# Patient Record
Sex: FEMALE | Race: ASIAN | NOT HISPANIC OR LATINO | Employment: FULL TIME | URBAN - METROPOLITAN AREA
[De-identification: names, ages, dates, MRNs, and addresses within clinical notes are randomized per-mention and may not be internally consistent; named-entity substitution may affect disease eponyms.]

---

## 2019-06-11 ENCOUNTER — OFFICE VISIT (OUTPATIENT)
Dept: URGENT CARE | Facility: CLINIC | Age: 24
End: 2019-06-11
Payer: COMMERCIAL

## 2019-06-11 VITALS
RESPIRATION RATE: 16 BRPM | TEMPERATURE: 98 F | BODY MASS INDEX: 25.52 KG/M2 | SYSTOLIC BLOOD PRESSURE: 134 MMHG | HEART RATE: 77 BPM | WEIGHT: 130 LBS | OXYGEN SATURATION: 100 % | HEIGHT: 60 IN | DIASTOLIC BLOOD PRESSURE: 70 MMHG

## 2019-06-11 DIAGNOSIS — N89.8 VAGINAL ITCHING: Primary | ICD-10-CM

## 2019-06-11 DIAGNOSIS — Z20.2 POSSIBLE EXPOSURE TO STD: ICD-10-CM

## 2019-06-11 LAB
B-HCG UR QL: NEGATIVE
BILIRUB UR QL STRIP: NEGATIVE
CTP QC/QA: YES
GLUCOSE UR QL STRIP: NEGATIVE
KETONES UR QL STRIP: NEGATIVE
LEUKOCYTE ESTERASE UR QL STRIP: NEGATIVE
PH, POC UA: 6 (ref 5–8)
POC BLOOD, URINE: NEGATIVE
POC NITRATES, URINE: NEGATIVE
PROT UR QL STRIP: NEGATIVE
SP GR UR STRIP: 1.01 (ref 1–1.03)
UROBILINOGEN UR STRIP-ACNC: NORMAL (ref 0.1–1.1)

## 2019-06-11 PROCEDURE — 87510 GARDNER VAG DNA DIR PROBE: CPT

## 2019-06-11 PROCEDURE — 87491 CHLMYD TRACH DNA AMP PROBE: CPT

## 2019-06-11 PROCEDURE — 99203 PR OFFICE/OUTPT VISIT, NEW, LEVL III, 30-44 MIN: ICD-10-PCS | Mod: S$GLB,,, | Performed by: NURSE PRACTITIONER

## 2019-06-11 PROCEDURE — 81003 URINALYSIS AUTO W/O SCOPE: CPT | Mod: QW,S$GLB,, | Performed by: NURSE PRACTITIONER

## 2019-06-11 PROCEDURE — 3008F PR BODY MASS INDEX (BMI) DOCUMENTED: ICD-10-PCS | Mod: CPTII,S$GLB,, | Performed by: NURSE PRACTITIONER

## 2019-06-11 PROCEDURE — 81003 POCT URINALYSIS, DIPSTICK, AUTOMATED, W/O SCOPE: ICD-10-PCS | Mod: QW,S$GLB,, | Performed by: NURSE PRACTITIONER

## 2019-06-11 PROCEDURE — 99203 OFFICE O/P NEW LOW 30 MIN: CPT | Mod: S$GLB,,, | Performed by: NURSE PRACTITIONER

## 2019-06-11 PROCEDURE — 81025 URINE PREGNANCY TEST: CPT | Mod: S$GLB,,, | Performed by: NURSE PRACTITIONER

## 2019-06-11 PROCEDURE — 81025 POCT URINE PREGNANCY: ICD-10-PCS | Mod: S$GLB,,, | Performed by: NURSE PRACTITIONER

## 2019-06-11 PROCEDURE — 3008F BODY MASS INDEX DOCD: CPT | Mod: CPTII,S$GLB,, | Performed by: NURSE PRACTITIONER

## 2019-06-11 PROCEDURE — 87480 CANDIDA DNA DIR PROBE: CPT

## 2019-06-11 RX ORDER — FLUCONAZOLE 150 MG/1
150 TABLET ORAL DAILY
Qty: 1 TABLET | Refills: 0 | Status: SHIPPED | OUTPATIENT
Start: 2019-06-11 | End: 2019-06-12

## 2019-06-11 NOTE — PROGRESS NOTES
Subjective:       Patient ID: Ramiro Moe is a 23 y.o. female.    Vitals:  height is 5' (1.524 m) and weight is 59 kg (130 lb). Her temperature is 98.4 °F (36.9 °C). Her blood pressure is 134/70 and her pulse is 77. Her respiration is 16 and oxygen saturation is 100%.     Chief Complaint: Vaginal Itching    Vaginal discomfort for 3 days     Vaginal Itching   The patient's primary symptoms include genital itching. The patient's pertinent negatives include no missed menses, pelvic pain or vaginal discharge. This is a new problem. The problem occurs intermittently. The problem has been unchanged. She is not pregnant. Pertinent negatives include no abdominal pain, back pain, chills, dysuria, fever, frequency, hematuria, nausea, rash, urgency or vomiting. Nothing aggravates the symptoms. She has tried nothing for the symptoms. She is sexually active. She uses condoms for contraception. There is no history of ovarian cysts.       Constitution: Negative for chills and fever.   Neck: Negative for painful lymph nodes.   Gastrointestinal: Negative for abdominal pain, nausea and vomiting.   Genitourinary: Negative for dysuria, frequency, urgency, urine decreased, hematuria, history of kidney stones, painful menstruation, irregular menstruation, missed menses, heavy menstrual bleeding, ovarian cysts, genital trauma, vaginal pain, vaginal discharge, vaginal bleeding, vaginal odor, painful intercourse, genital sore, painful ejaculation and pelvic pain.   Musculoskeletal: Negative for back pain.   Skin: Negative for rash and lesion.   Hematologic/Lymphatic: Negative for swollen lymph nodes.       Objective:      Physical Exam   Constitutional: She is oriented to person, place, and time. She appears well-developed and well-nourished. She is cooperative.  Non-toxic appearance. She does not appear ill. No distress.   HENT:   Head: Normocephalic and atraumatic.   Right Ear: Hearing, tympanic membrane, external ear and ear canal  normal.   Left Ear: Hearing, tympanic membrane, external ear and ear canal normal.   Nose: Nose normal. No mucosal edema, rhinorrhea or nasal deformity. No epistaxis. Right sinus exhibits no maxillary sinus tenderness and no frontal sinus tenderness. Left sinus exhibits no maxillary sinus tenderness and no frontal sinus tenderness.   Mouth/Throat: Uvula is midline, oropharynx is clear and moist and mucous membranes are normal. No trismus in the jaw. Normal dentition. No uvula swelling. No posterior oropharyngeal erythema.   Eyes: Conjunctivae and lids are normal. Right eye exhibits no discharge. Left eye exhibits no discharge. No scleral icterus.   Sclera clear bilat   Neck: Trachea normal, normal range of motion, full passive range of motion without pain and phonation normal. Neck supple.   Cardiovascular: Normal rate, regular rhythm, normal heart sounds, intact distal pulses and normal pulses.   Pulmonary/Chest: Effort normal and breath sounds normal. No respiratory distress.   Abdominal: Soft. Normal appearance and bowel sounds are normal. She exhibits no distension, no pulsatile midline mass and no mass. There is no tenderness. There is no rigidity, no rebound, no guarding, no CVA tenderness, no tenderness at McBurney's point and negative Farr's sign.   Musculoskeletal: Normal range of motion. She exhibits no edema or deformity.   Neurological: She is alert and oriented to person, place, and time. She exhibits normal muscle tone. Coordination normal.   Skin: Skin is warm, dry and intact. She is not diaphoretic. No pallor.   Psychiatric: She has a normal mood and affect. Her speech is normal and behavior is normal. Judgment and thought content normal. Cognition and memory are normal.   Nursing note and vitals reviewed.      Results for orders placed or performed in visit on 06/11/19   POCT Urinalysis, Dipstick, Automated, W/O Scope   Result Value Ref Range    POC Blood, Urine Negative Negative    POC Bilirubin,  Urine Negative Negative    POC Urobilinogen, Urine Normal 0.1 - 1.1    POC Ketones, Urine Negative Negative    POC Protein, Urine Negative Negative    POC Nitrates, Urine Negative Negative    POC Glucose, Urine Negative Negative    pH, UA 6.0 5 - 8    POC Specific Gravity, Urine 1.010 1.003 - 1.029    POC Leukocytes, Urine Negative Negative   POCT urine pregnancy   Result Value Ref Range    POC Preg Test, Ur Negative Negative     Acceptable Yes      Assessment:       1. Vaginal itching    2. Possible exposure to STD        Plan:       Patient concern for cost of STD testing.  Testing for gonorrhea, chlamydia and Trichomonas as today. Given patient [facility] Flyer for remainder of testing.   Vaginal itching  -     POCT Urinalysis, Dipstick, Automated, W/O Scope  -     Bv, Trich, Candida by DNA Probe (Swab Only)    Possible exposure to STD  -     C. trachomatis/N. gonorrhoeae by AMP DNA [facility]; Urine          Patient Instructions     Will call patient with results of testing as soon as we get them back.    Educated patient on safe sex practices.    Educated patient to follow up with OBGYN.      If You Think You Have an STD  Treating a sexually transmitted disease (STD) early limits the problems they can cause. If you have an STD, get treated right away. Ask your partner to be tested, too. Then avoid sex until youve finished treatment and your healthcare provider says its OK to have sex again.    Follow your treatment plan  Treatment depends on the type of STD you have. Common treatments include injections and oral pills or liquids. Creams and gels can be applied to sores caused by certain STDs. Follow the tips below:  · Get new treatment for each new STD.  · Dont use old medicine, even for the same STD. Use medicines as directed.  · Dont share medicine unless instructed to do so by your healthcare provider or clinic.  Talk to your partner  If you have an STD, its your duty to tell all your  recent partners so they can be tested and treated. This is one important way to prevent the disease from being spread. Telling a partner that you have an STD can be hard. You may be embarrassed, angry, or afraid. Its often unclear who had the STD first. So try not to place blame. Your healthcare provider may offer some advice on how to begin.  Prevent future problems  Even after youve been treated, you can still be infected again. This is a common problem. It happens when a partner passes the STD back to you. To avoid this, your partner must be tested. He or she may also need treatment. After treatment, go to any scheduled follow-up visits. Then prevent future problems with safer sex. Limit your number of partners and always use a latex condom.  Diagnosing STDS  Your healthcare provider will take a health history and examine you. During your health history, you will be asked about your sex habits and health history. You may also be asked about drug use. Give honest answers. Your healthcare provider will then check your body for signs of STDs. He or she also may perform one or more of the following tests:  · Fluid is swabbed from any sores. Samples also may be taken from the vagina, penis, mouth, or rectum. The samples are then tested for STDs.  · Blood or urine samples may be taken. They are checked for viruses or bacteria that cause STDs.  · For women, cells from the cervix (where the vagina and uterus meet) are checked for signs of cancer. This is called a Pap smear. If cell changes are found, a magnifying scope may be used to take a closer look (colposcopy).   Date Last Reviewed: 12/1/2016 © 2000-2017 [employer] [address] [place] [address]. All rights reserved. This information is not intended as a substitute for professional medical care. Always follow your healthcare professional's instructions.        What is Genital HPV?  HPV (human papillomavirus) is a very common family of  viruses. Some strains of genital HPV can cause abnormal changes (dysplasia) in the cells of a womans cervix. In a small number of women, these changes can lead to cancer if not treated. Also, certain strains of HPV can cause genital warts (condyloma). Although many people carry HPV, it often causes no symptoms. The virus may first be detected when signs of dysplasia or warts are found during a Pap test.     Dysplasia develops when cervical cells change in ways that are not normal.             Warts on the cervix can be detected with a Pap test.             Warts around the genitals may be visible. These external warts can affect the vulva, vagina, and anus.      How does HPV spread?  HPV lives inside skin and mucous membranes. It spreads when skin carrying the virus touches other skin. Genital HPV most often spreads during sexual contact. Condoms and other barriers help protect against the spread by preventing skin contact. But condoms may not cover all affected skin, so they may not provide complete protection. There is no cure for HPV. Even if symptoms go away, the virus may remain in the body. Because it often doesnt cause symptoms, many people who have HPV dont even know it.  When dysplasia occurs  The cervix is the narrow canal at the bottom of the uterus. Its made up of layers of cells that normally change as they grow. Dysplasia occurs when HPV causes some cervical cells to change in ways that are not normal. These abnormal cells can be detected with a Pap test. Left  untreated, abnormal cells can develop into cancer.  When warts form  Certain strains of HPV can make skin cells reproduce more often than they should. These extra skin cells build up into warts. Warts can form on the cervix. They can also form around or inside the genitals. Treating warts helps keep HPV from spreading to sexual partners.     A vaccine is available that protects against certain strains of HPV. Your health care provider can  tell you more.   Date Last Reviewed: 1/1/2017  © 6268-3923 [employer]. [address] [place] [address], [place] [address]. All rights reserved. This information is not intended as a substitute for professional medical care. Always follow your healthcare professional's instructions.

## 2019-06-11 NOTE — PATIENT INSTRUCTIONS
Will call patient with results of testing as soon as we get them back.    Educated patient on safe sex practices.    Educated patient to follow up with OBGYN.      If You Think You Have an STD  Treating a sexually transmitted disease (STD) early limits the problems they can cause. If you have an STD, get treated right away. Ask your partner to be tested, too. Then avoid sex until youve finished treatment and your healthcare provider says its OK to have sex again.    Follow your treatment plan  Treatment depends on the type of STD you have. Common treatments include injections and oral pills or liquids. Creams and gels can be applied to sores caused by certain STDs. Follow the tips below:  · Get new treatment for each new STD.  · Dont use old medicine, even for the same STD. Use medicines as directed.  · Dont share medicine unless instructed to do so by your healthcare provider or clinic.  Talk to your partner  If you have an STD, its your duty to tell all your recent partners so they can be tested and treated. This is one important way to prevent the disease from being spread. Telling a partner that you have an STD can be hard. You may be embarrassed, angry, or afraid. Its often unclear who had the STD first. So try not to place blame. Your healthcare provider may offer some advice on how to begin.  Prevent future problems  Even after youve been treated, you can still be infected again. This is a common problem. It happens when a partner passes the STD back to you. To avoid this, your partner must be tested. He or she may also need treatment. After treatment, go to any scheduled follow-up visits. Then prevent future problems with safer sex. Limit your number of partners and always use a latex condom.  Diagnosing STDS  Your healthcare provider will take a health history and examine you. During your health history, you will be asked about your sex habits and health history. You may also be asked about drug use.  Give honest answers. Your healthcare provider will then check your body for signs of STDs. He or she also may perform one or more of the following tests:  · Fluid is swabbed from any sores. Samples also may be taken from the vagina, penis, mouth, or rectum. The samples are then tested for STDs.  · Blood or urine samples may be taken. They are checked for viruses or bacteria that cause STDs.  · For women, cells from the cervix (where the vagina and uterus meet) are checked for signs of cancer. This is called a Pap smear. If cell changes are found, a magnifying scope may be used to take a closer look (colposcopy).   Date Last Reviewed: 12/1/2016 © 2000-2017 [employer]. [address] [place] [address], [place] [address]. All rights reserved. This information is not intended as a substitute for professional medical care. Always follow your healthcare professional's instructions.        What is Genital HPV?  HPV (human papillomavirus) is a very common family of viruses. Some strains of genital HPV can cause abnormal changes (dysplasia) in the cells of a womans cervix. In a small number of women, these changes can lead to cancer if not treated. Also, certain strains of HPV can cause genital warts (condyloma). Although many people carry HPV, it often causes no symptoms. The virus may first be detected when signs of dysplasia or warts are found during a Pap test.     Dysplasia develops when cervical cells change in ways that are not normal.             Warts on the cervix can be detected with a Pap test.             Warts around the genitals may be visible. These external warts can affect the vulva, vagina, and anus.      How does HPV spread?  HPV lives inside skin and mucous membranes. It spreads when skin carrying the virus touches other skin. Genital HPV most often spreads during sexual contact. Condoms and other barriers help protect against the spread by preventing skin contact. But condoms may not cover all  affected skin, so they may not provide complete protection. There is no cure for HPV. Even if symptoms go away, the virus may remain in the body. Because it often doesnt cause symptoms, many people who have HPV dont even know it.  When dysplasia occurs  The cervix is the narrow canal at the bottom of the uterus. Its made up of layers of cells that normally change as they grow. Dysplasia occurs when HPV causes some cervical cells to change in ways that are not normal. These abnormal cells can be detected with a Pap test. Left  untreated, abnormal cells can develop into cancer.  When warts form  Certain strains of HPV can make skin cells reproduce more often than they should. These extra skin cells build up into warts. Warts can form on the cervix. They can also form around or inside the genitals. Treating warts helps keep HPV from spreading to sexual partners.     A vaccine is available that protects against certain strains of HPV. Your health care provider can tell you more.   Date Last Reviewed: 1/1/2017  © 4029-3778 [employer]. [address] [place] [address], [place] [address]. All rights reserved. This information is not intended as a substitute for professional medical care. Always follow your healthcare professional's instructions.

## 2019-06-13 ENCOUNTER — TELEPHONE (OUTPATIENT)
Dept: URGENT CARE | Facility: CLINIC | Age: 24
End: 2019-06-13

## 2019-06-13 LAB
BACTERIAL VAGINOSIS DNA: NEGATIVE
C TRACH DNA SPEC QL NAA+PROBE: NOT DETECTED
CANDIDA GLABRATA DNA: NEGATIVE
CANDIDA KRUSEI DNA: NEGATIVE
CANDIDA RRNA VAG QL PROBE: NEGATIVE
N GONORRHOEA DNA SPEC QL NAA+PROBE: NOT DETECTED
T VAGINALIS RRNA GENITAL QL PROBE: NEGATIVE

## 2019-06-14 ENCOUNTER — TELEPHONE (OUTPATIENT)
Dept: URGENT CARE | Facility: CLINIC | Age: 24
End: 2019-06-14

## 2019-06-27 ENCOUNTER — OFFICE VISIT (OUTPATIENT)
Dept: URGENT CARE | Facility: CLINIC | Age: 24
End: 2019-06-27
Payer: COMMERCIAL

## 2019-06-27 ENCOUNTER — OFFICE VISIT (OUTPATIENT)
Dept: OPTOMETRY | Facility: CLINIC | Age: 24
End: 2019-06-27
Payer: COMMERCIAL

## 2019-06-27 VITALS
HEIGHT: 60 IN | RESPIRATION RATE: 20 BRPM | WEIGHT: 130 LBS | TEMPERATURE: 99 F | HEART RATE: 59 BPM | OXYGEN SATURATION: 99 % | BODY MASS INDEX: 25.52 KG/M2 | SYSTOLIC BLOOD PRESSURE: 120 MMHG | DIASTOLIC BLOOD PRESSURE: 77 MMHG

## 2019-06-27 DIAGNOSIS — H16.002 CORNEAL ULCER OF LEFT EYE: Primary | ICD-10-CM

## 2019-06-27 DIAGNOSIS — T15.92XA EYE FOREIGN BODY, LEFT, INITIAL ENCOUNTER: Primary | ICD-10-CM

## 2019-06-27 PROCEDURE — 3008F BODY MASS INDEX DOCD: CPT | Mod: CPTII,S$GLB,, | Performed by: NURSE PRACTITIONER

## 2019-06-27 PROCEDURE — 3008F PR BODY MASS INDEX (BMI) DOCUMENTED: ICD-10-PCS | Mod: CPTII,S$GLB,, | Performed by: NURSE PRACTITIONER

## 2019-06-27 PROCEDURE — 99999 PR PBB SHADOW E&M-EST. PATIENT-LVL II: CPT | Mod: PBBFAC,,, | Performed by: OPTOMETRIST

## 2019-06-27 PROCEDURE — 99999 PR PBB SHADOW E&M-EST. PATIENT-LVL II: ICD-10-PCS | Mod: PBBFAC,,, | Performed by: OPTOMETRIST

## 2019-06-27 PROCEDURE — 99214 OFFICE O/P EST MOD 30 MIN: CPT | Mod: S$GLB,,, | Performed by: NURSE PRACTITIONER

## 2019-06-27 PROCEDURE — 92002 INTRM OPH EXAM NEW PATIENT: CPT | Mod: S$PBB,,, | Performed by: OPTOMETRIST

## 2019-06-27 PROCEDURE — 99214 PR OFFICE/OUTPT VISIT, EST, LEVL IV, 30-39 MIN: ICD-10-PCS | Mod: S$GLB,,, | Performed by: NURSE PRACTITIONER

## 2019-06-27 PROCEDURE — 92002 PR EYE EXAM, NEW PATIENT,INTERMED: ICD-10-PCS | Mod: S$PBB,,, | Performed by: OPTOMETRIST

## 2019-06-27 RX ORDER — NEOMYCIN SULFATE, POLYMYXIN B SULFATE AND DEXAMETHASONE 3.5; 10000; 1 MG/ML; [USP'U]/ML; MG/ML
1 SUSPENSION/ DROPS OPHTHALMIC EVERY 6 HOURS
Qty: 2.5 ML | Refills: 0 | Status: SHIPPED | OUTPATIENT
Start: 2019-06-27 | End: 2019-06-27

## 2019-06-27 RX ORDER — OFLOXACIN 3 MG/ML
1 SOLUTION/ DROPS OPHTHALMIC
Qty: 10 ML | Refills: 0 | Status: SHIPPED | OUTPATIENT
Start: 2019-06-27 | End: 2019-07-10

## 2019-06-27 RX ORDER — ERYTHROMYCIN 5 MG/G
OINTMENT OPHTHALMIC NIGHTLY
Qty: 3.5 G | Refills: 0 | Status: SHIPPED | OUTPATIENT
Start: 2019-06-27 | End: 2019-07-07

## 2019-06-27 NOTE — LETTER
June 27, 2019      Isis Gonsales, NP  [facility] [address]           [facility]  [address]  [place] [address]  Phone: [phone]  Fax: [phone]          Patient: Ramiro Moe   MR Number: [id number]   YOB: 1995   Date of Visit: 6/27/2019       Dear Isis Gonsales:    Thank you for referring Ramiro Moe to me for evaluation. Attached you will find relevant portions of my assessment and plan of care.    If you have questions, please do not hesitate to call me. I look forward to following Ramiro Moe along with you.    Sincerely,    Joselyn Hobbs, OD    Enclosure  CC:  No Recipients    If you would like to receive this communication electronically, please contact [email or web address] or [phone] to request more information on [employer] access.    For providers and/or their staff who would like to refer a patient to [facility], please contact us through our one-stop-shop provider referral line, [facility], at [phone].    If you feel you have received this communication in error or would no longer like to receive these types of communications, please e-mail [email or web address]

## 2019-06-27 NOTE — PROGRESS NOTES
Subjective:       Patient ID: Ramiro Moe is a 23 y.o. female.    Vitals:  height is 5' (1.524 m) and weight is 59 kg (130 lb). Her temperature is 98.6 °F (37 °C). Her blood pressure is 120/77 and her pulse is 59 (abnormal). Her respiration is 20 and oxygen saturation is 99%.     Chief Complaint: Eye Problem    Yesterday pt states she yudith to feel like something was in her eye. she was at the beach on Sunday and it could be something there. She tried getting it out, she states it like a white spot under her eye lid. She did sleep on the beach and water did get in her eye.    Eye Problem    The left eye is affected. This is a new problem. The current episode started yesterday. The problem occurs constantly. The problem has been unchanged. The injury mechanism is unknown. Pertinent negatives include no blurred vision, eye discharge, double vision, eye redness, fever, itching, nausea, photophobia or vomiting.       Constitution: Negative for chills and fever.   HENT: Negative for congestion and sinus pain.    Eyes: Negative for eye trauma, foreign body in eye, eye discharge, eye itching, eye pain, eye redness, photophobia, vision loss, double vision, blurred vision and eyelid swelling.   Gastrointestinal: Negative for nausea and vomiting.   Genitourinary: Negative for history of kidney stones.   Skin: Negative for rash.   Allergic/Immunologic: Negative for seasonal allergies and itching.   Neurological: Negative for headaches.       Objective:      Physical Exam   Constitutional: She is oriented to person, place, and time. She appears well-developed and well-nourished.   HENT:   Head: Normocephalic and atraumatic.   Right Ear: External ear normal.   Left Ear: External ear normal.   Nose: Nose normal.   Mouth/Throat: Oropharynx is clear and moist.   Eyes: Pupils are equal, round, and reactive to light. EOM are normal. Right eye exhibits no discharge. Left eye exhibits no discharge and no exudate. Foreign body (at  1'oclock position-- ) present in the left eye. Left conjunctiva is injected. Right eye exhibits normal extraocular motion. Left eye exhibits normal extraocular motion.       Fluorescein stain uptake at Left eye FB--  at 1 o'clock position-- 1-2 mm size--attempted to remove with saline qtip without success.         Neck: Trachea normal, full passive range of motion without pain and phonation normal. Neck supple.   Musculoskeletal: Normal range of motion.   Neurological: She is alert and oriented to person, place, and time.   Skin: Skin is warm, dry and intact.   Psychiatric: She has a normal mood and affect. Her speech is normal and behavior is normal. Judgment and thought content normal. Cognition and memory are normal.   Nursing note and vitals reviewed.      Assessment:       1. Eye foreign body, left, initial encounter        Plan:         Eye foreign body, left, initial encounter  -     Ambulatory referral to Ophthalmology  -     neomycin-polymyxin-dexamethasone (MAXITROL) 3.5mg/mL-10,000 unit/mL-0.1 % DrpS; Place 1 drop into the left eye every 6 (six) hours. for 10 days  Dispense: 2.5 mL; Refill: 0

## 2019-06-27 NOTE — PATIENT INSTRUCTIONS
Return to Urgent Care or go to ER if symptoms worsen or fail to improve.  Follow up with PCP as recommended for further management.     You have been referred to Ophthalmology for further management.  Scheduling should contact you today to make an appointment.  If you do not hear from anyone, please call [phone] to schedule an Urgent appointment with Ophthalmology for today.             Particle in the Eye (Conjunctival Foreign Body, Child)    The conjunctiva is a thin membrane in the eye. It covers the white of the eye and the inside of the eyelid. A very small object, such as an eyelash or dirt, can become trapped under the eyelid. This is called a conjunctival foreign body. This can be very irritating to the eye, no matter how small the object is.  The eye may be very painful. The eye and the eyelid may become red and swollen. It can become hard for the child to open the eye or even to sleep.  A topical anesthetic may be put in your childs eye. This soothes pain. It also helps the healthcare provider look at the eye and remove the object.  Home care  Your childs healthcare provider may prescribe eye drops or an ointment. These are to help ease pain and prevent infection. Follow all instructions when using these medicines.  To give your child eye medicine  1. Wash your hands well with soap and warm water. This is to help prevent infection.  2. Remove any drainage from your childs eye with a clean tissue. Wipe from the nose toward the ear. This is to keep the eye as clean as possible.  3. To remove eye crusts, wet a washcloth with warm water and place it over the eye. Wait 1 minute. Gently wipe the eye from the nose outward with the washcloth. Do this until the eye is clear. If both eyes need cleaning, use a separate cloth for each eye.  4. Have your child lie down on a flat surface. A rolled-up towel or pillow may be placed under the neck so that the head is tilted back. Gently hold your childs head, if  needed.  5. Using eye drops: Apply drops in the corner of the eye, where the eyelid meets the nose. The drops will pool in this area. When your child blinks or opens his or her lids, the drops will flow into the eye. Give the exact number of drops prescribed. Be careful not to touch the eye or eyelashes with the dropper.  6. Using ointment: If both drops and ointment are prescribed, give the drops first. Wait 3 minutes, and then apply the ointment. Doing this will give each medicine time to work. To apply the ointment, start by gently pulling down the lower lid. Place a thin line of ointment along the inside of the lid. Begin at the nose and move outward. Close the lid. Wipe away excess medication from the nose outward. This is to keep the eye as clean as possible. Have your child keep the eye closed for 1 or 2 minutes so the medicine has time to coat the eye. Eye ointment may cause blurry vision. This is normal. Apply ointment right before your child goes to sleep. In infants, the ointment may be easier to apply while your child is sleeping.  General care  · Wash your hands well with soap and warm water before and after caring for your childs eye.  · Gently wipe eye crusts away with a clean, warm, damp washcloth.  · Make sure your child doesnt rub his or her eyes.  · Trim your childs nails weekly to prevent eye scratches.  · Dont let your child wear contact lenses until all the symptoms are gone.  Follow-up care  Follow up with your childs healthcare provider, or as advised.  When to seek medical advice  For a usually healthy child, call the healthcare provider right away if any of these occur:  · Your child is of any age and has repeated fevers above 104°F (40°C).  · Your child is 3 months old or younger and has a fever of 100.4°F (38°C) or higher (Get medical care right away. Fever in a young baby can be a sign of a dangerous infection.)  · Your child is younger than 2 years of age and a fever of 100.4°F  (38°C) continues for more than 1 day.  · Your child is 2 years old or older and a fever of 100.4°F (38°C) continues for more than 3 days.  · Symptoms dont get better, or get worse.  · Your child has vision changes, such as trouble seeing.  · Your infant cannot follow movement with eyes.  · Your child has signs of infection, such as warmth, redness, swelling, or fluid leaking from the eye.  · Your childs pain gets worse. Babies may show pain as crying or fussing that cant be soothed.  · Your child is unable to keep his or her eye open after a few days of treatment.  Call 911  Contact local emergency services right away if any of these occur:  · Your child has trouble breathing.  · Your child shows confusion.  · Your child is very drowsy or has trouble awakening.  · Your child faints or loses consciousness.  · Your child has a rapid heart rate.  · Your child has a seizure.  · Your child has a stiff neck.  Date Last Reviewed: 6/15/2015  © 8364-8810 [employer]. [address] [place] [address], [place] [address]. All rights reserved. This information is not intended as a substitute for professional medical care. Always follow your healthcare professional's instructions.